# Patient Record
Sex: MALE | Race: AMERICAN INDIAN OR ALASKA NATIVE
[De-identification: names, ages, dates, MRNs, and addresses within clinical notes are randomized per-mention and may not be internally consistent; named-entity substitution may affect disease eponyms.]

---

## 2018-06-15 ENCOUNTER — HOSPITAL ENCOUNTER (EMERGENCY)
Dept: HOSPITAL 5 - ED | Age: 23
LOS: 1 days | Discharge: HOME | End: 2018-06-16
Payer: SELF-PAY
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
## 2018-06-15 DIAGNOSIS — S81.802A: Primary | ICD-10-CM
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
## 2018-06-15 LAB
ALBUMIN SERPL-MCNC: 4.4 G/DL (ref 3.9–5)
ALT SERPL-CCNC: 10 UNITS/L (ref 7–56)
APTT BLD: 29.4 SEC. (ref 24.2–36.6)
BUN SERPL-MCNC: 14 MG/DL (ref 9–20)
BUN/CREAT SERPL: 14 %
CALCIUM SERPL-MCNC: 9.1 MG/DL (ref 8.4–10.2)
HCT VFR BLD CALC: 37.8 % (ref 35.5–45.6)
HEMOLYSIS INDEX: 7
HGB BLD-MCNC: 12.5 GM/DL (ref 11.8–15.2)
INR PPP: 0.95 (ref 0.87–1.13)
MCH RBC QN AUTO: 27 PG (ref 28–32)
MCHC RBC AUTO-ENTMCNC: 33 % (ref 32–34)
MCV RBC AUTO: 83 FL (ref 84–94)
PLATELET # BLD: 262 K/MM3 (ref 140–440)
RBC # BLD AUTO: 4.56 M/MM3 (ref 3.65–5.03)

## 2018-06-15 PROCEDURE — 29505 APPLICATION LONG LEG SPLINT: CPT

## 2018-06-15 PROCEDURE — 85007 BL SMEAR W/DIFF WBC COUNT: CPT

## 2018-06-15 PROCEDURE — 73552 X-RAY EXAM OF FEMUR 2/>: CPT

## 2018-06-15 PROCEDURE — 36415 COLL VENOUS BLD VENIPUNCTURE: CPT

## 2018-06-15 PROCEDURE — 85610 PROTHROMBIN TIME: CPT

## 2018-06-15 PROCEDURE — 73590 X-RAY EXAM OF LOWER LEG: CPT

## 2018-06-15 PROCEDURE — 85730 THROMBOPLASTIN TIME PARTIAL: CPT

## 2018-06-15 PROCEDURE — 85025 COMPLETE CBC W/AUTO DIFF WBC: CPT

## 2018-06-15 PROCEDURE — 96375 TX/PRO/DX INJ NEW DRUG ADDON: CPT

## 2018-06-15 PROCEDURE — 96365 THER/PROPH/DIAG IV INF INIT: CPT

## 2018-06-15 PROCEDURE — 80053 COMPREHEN METABOLIC PANEL: CPT

## 2018-06-15 PROCEDURE — 99291 CRITICAL CARE FIRST HOUR: CPT
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
## 2018-06-16 VITALS — SYSTOLIC BLOOD PRESSURE: 92 MMHG | DIASTOLIC BLOOD PRESSURE: 47 MMHG

## 2018-06-16 LAB
BAND NEUTROPHILS # (MANUAL): 0.1 K/MM3
MYELOCYTES # (MANUAL): 0 K/MM3
PROMYELOCYTES # (MANUAL): 0 K/MM3
TOTAL CELLS COUNTED BLD: 100
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
